# Patient Record
Sex: FEMALE | Race: OTHER
[De-identification: names, ages, dates, MRNs, and addresses within clinical notes are randomized per-mention and may not be internally consistent; named-entity substitution may affect disease eponyms.]

---
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
## 2023-04-19 NOTE — NUR
PT ARRIVED TO THE MEDICAL FLOOR AROUND [id number] FROM THE ER VIA GURNEY, PT IS UP
WITH MINIMAL ASSIST, PT FAMILY AT THE BEDSIDE T/O THE DAY, THE PT REPORTED
SOME ABD TENDERNESS THIS AM AND REPORTED NO BM FOR 2 DAYS, PRUNES AND PRUNE
JUICE WAS GIVEN, THE PT REPORTED HAVING A BM THIS EVENING, THE PT REPORTED
HAVING A SORE THROAT, LOZENGERS WERE ORDER BY  AND GIVEN THIS EVENING,
THE PT WAS AFEBRILE T/O THE DAY, NO OTHER CHANGES NOTICED THIS SHIFT, CALL
LIGHT IN REACH, FAMILY AT THE BEDSIDE AT THIS TIME No